# Patient Record
Sex: FEMALE | Race: OTHER | URBAN - METROPOLITAN AREA
[De-identification: names, ages, dates, MRNs, and addresses within clinical notes are randomized per-mention and may not be internally consistent; named-entity substitution may affect disease eponyms.]

---

## 2019-10-17 NOTE — PROCEDURE NOTE: SURGICAL
MR #: [id number] /><br />PREOPERATIVE DIAGNOSIS: Cataract, right eye.<br /><br />POSTOPERATIVE DIAGNOSIS: Same.<br /><br />OPERATIVE PROCEDURE: Phacoemulsification with +20.5 diopter Bert &amp; Bert AAB00, PC-IOL, right eye.<br /><br />PROCEDURE:&nbsp; Following sedation, Yg Espinal CRNA, administered regional anesthesia by infiltration as per the anesthetic record. <br /><br />Prior to commencing surgery patient identification, surgical procedure, site, and side were confirmed by Dr. Faye.&nbsp; The patient was then prepped with Betadine and draped with sterile drapes. A lid speculum was placed and the side port incision prepared. &nbsp; 0.5 cc of Epi-Shugarcaine was instilled and the anterior chamber entered at the temporal 180&deg; limbus in a single plane fashion employing a 2.7 mm trapezoid savannah and ring fixation. Viscoelastic was placed, a circular capsulorhexis performed, hydrodissection accomplished and the nucleus emulsified within the posterior chamber. Cortex was aspirated with the I/A handpiece, the posterior capsule polished and viscoelastic instilled to distend the capsular sac. A +20.5 diopter Bert &amp; Magdaline Odor was placed into the bag under direct visualization without difficulty employing the microinjector. Viscoelastic was aspirated with the I/A handpiece and the anterior chamber pressurized with BSS. The incision was tested for leaks and none were found. A single injection of 0.2 cc of Moxifloxacin was placed in the anterior chamber. The patient returned to the holding area having tolerated the procedure extremely well and without complication. <br /><br />Epi-shugarcaine consists of a mixture of 1cc epinephrine MPF 1:1000, 0.75 cc 4% lidocaine MPF and 2.25 cc BSS. Moxifloxacin consists of a mixture of Vigamox and BSS 1 mg/1 ml solution. <br /><br /><br />

## 2019-10-17 NOTE — PATIENT DISCUSSION
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 It can make cataract surgery more difficult and increase the risk of complications including iris damage and posterior capsule rupture with possible need for a second surgery.

## 2021-01-27 ENCOUNTER — IMPORTED ENCOUNTER (OUTPATIENT)
Dept: URBAN - METROPOLITAN AREA CLINIC 9 | Facility: CLINIC | Age: 76
End: 2021-01-27

## 2021-07-27 ENCOUNTER — IMPORTED ENCOUNTER (OUTPATIENT)
Dept: URBAN - METROPOLITAN AREA CLINIC 9 | Facility: CLINIC | Age: 76
End: 2021-07-27

## 2021-09-16 NOTE — PROCEDURE NOTE: SURGICAL
<p>Prior to commencing surgery patient identification, surgical procedure, site, and side were confirmed by Dr. Faye.&nbsp; Following topical proparacaine anesthesia, the patient was positioned at the YAG laser, a contact lens coupled to the cornea of the right eye with methylcellulose and an axial posterior capsulotomy performed without complication using 3.4 Mj x 14. Attention was then turned to the left eye and a contact lens coupled to the cornea of the left eye with methylcellulose and an axial posterior capsulotomy performed without complication using 3.3 Mj x 11. One drop of Alphagan was instilled in both eyes and the patient returned to the holding area having tolerated the procedure well and without complication. </p><p>MRN [id number]</p>

## 2021-10-18 ASSESSMENT — VISUAL ACUITY
OS_SC: 20/40 -2 SN
OS_CC: 20/25 +2 SN
OD_CC: 20/25 + SN
OS_PH: 20/40 SN
OD_SC: 20/25 SN
OS_SC: 20/60 SN
OD_SC: 20/30 SN

## 2021-10-18 ASSESSMENT — TONOMETRY
OS_IOP_MMHG: 11
OD_IOP_MMHG: 11
OS_IOP_MMHG: 12
OD_IOP_MMHG: 11

## 2024-01-11 ENCOUNTER — NEW PATIENT (OUTPATIENT)
Dept: URBAN - METROPOLITAN AREA CLINIC 13 | Facility: CLINIC | Age: 79
End: 2024-01-11

## 2024-01-11 DIAGNOSIS — H43.811: ICD-10-CM

## 2024-01-11 DIAGNOSIS — H04.123: ICD-10-CM

## 2024-01-11 DIAGNOSIS — H52.223: ICD-10-CM

## 2024-01-11 PROCEDURE — 92004 COMPRE OPH EXAM NEW PT 1/>: CPT

## 2024-01-11 PROCEDURE — 92250 FUNDUS PHOTOGRAPHY W/I&R: CPT

## 2024-01-11 PROCEDURE — 92015 DETERMINE REFRACTIVE STATE: CPT

## 2024-01-11 ASSESSMENT — KERATOMETRY
OD_K1POWER_DIOPTERS: 46.00
OD_K2POWER_DIOPTERS: 48.25
OS_AXISANGLE_DEGREES: 157
OD_AXISANGLE2_DEGREES: 96
OS_AXISANGLE2_DEGREES: 67
OS_K2POWER_DIOPTERS: 46.75
OS_K1POWER_DIOPTERS: 46.00
OD_AXISANGLE_DEGREES: 6

## 2024-01-11 ASSESSMENT — VISUAL ACUITY
OD_SC: 20/80
OS_SC: 20/100
OU_SC: 20/80-1

## 2024-01-11 ASSESSMENT — TONOMETRY
OS_IOP_MMHG: 5
OD_IOP_MMHG: 6